# Patient Record
Sex: FEMALE | NOT HISPANIC OR LATINO | URBAN - METROPOLITAN AREA
[De-identification: names, ages, dates, MRNs, and addresses within clinical notes are randomized per-mention and may not be internally consistent; named-entity substitution may affect disease eponyms.]

---

## 2019-04-15 ENCOUNTER — OFFICE (OUTPATIENT)
Dept: URBAN - METROPOLITAN AREA CLINIC 67 | Facility: CLINIC | Age: 29
End: 2019-04-15

## 2019-04-15 VITALS — WEIGHT: 155 LBS | SYSTOLIC BLOOD PRESSURE: 112 MMHG | HEIGHT: 64 IN | DIASTOLIC BLOOD PRESSURE: 82 MMHG

## 2019-04-15 DIAGNOSIS — R19.4 ALTERED BOWEL FUNCTION: ICD-10-CM

## 2019-04-15 DIAGNOSIS — K62.89 ANORECTAL PAIN: ICD-10-CM

## 2019-04-15 DIAGNOSIS — K62.5 RECTAL BLEEDING: ICD-10-CM

## 2019-04-15 DIAGNOSIS — R14.0 BLOATING SYMPTOM: ICD-10-CM

## 2019-04-15 DIAGNOSIS — R14.3 FLATULENCE SYMPTOM: ICD-10-CM

## 2019-04-15 DIAGNOSIS — R19.7 DIARRHEA (UNSPECIFIED): ICD-10-CM

## 2019-04-15 DIAGNOSIS — K59.00 CONSTIPATION: ICD-10-CM

## 2019-04-15 PROCEDURE — 99243 OFF/OP CNSLTJ NEW/EST LOW 30: CPT | Performed by: NURSE PRACTITIONER

## 2019-04-15 RX ORDER — HYDROCORTISONE 25 MG/G
CREAM TOPICAL
Qty: 1 | Status: ACTIVE
Start: 2019-04-15

## 2019-04-15 NOTE — SERVICEHPINOTES
RAZIA CAR   is seen for an initial visit today.     29 yo patient self referred anorectal pain and tenderness, excess gas and lower abd bloating, change in bowel habits manifested by D&ltC (with hard stools) for >1 months as well as rectal bleeding w/ varying amounts of BRBPR   in the toilet water for about 2 months. Denies abd pain, N/V, anorexia or wt loss. FHX noncontributory.

## 2019-04-18 LAB
CBC (INCLUDES DIFF/PLT): ABSOLUTE BAND NEUTROPHILS: NORMAL CELLS/UL
CBC (INCLUDES DIFF/PLT): ABSOLUTE BASOPHILS: 29 CELLS/UL (ref 0–200)
CBC (INCLUDES DIFF/PLT): ABSOLUTE BLASTS: NORMAL CELLS/UL
CBC (INCLUDES DIFF/PLT): ABSOLUTE EOSINOPHILS: 69 CELLS/UL (ref 15–500)
CBC (INCLUDES DIFF/PLT): ABSOLUTE LYMPHOCYTES: 1632 CELLS/UL (ref 850–3900)
CBC (INCLUDES DIFF/PLT): ABSOLUTE METAMYELOCYTES: NORMAL CELLS/UL
CBC (INCLUDES DIFF/PLT): ABSOLUTE MONOCYTES: 299 CELLS/UL (ref 200–950)
CBC (INCLUDES DIFF/PLT): ABSOLUTE MYELOCYTES: NORMAL CELLS/UL
CBC (INCLUDES DIFF/PLT): ABSOLUTE NEUTROPHILS: 2871 CELLS/UL (ref 1500–7800)
CBC (INCLUDES DIFF/PLT): ABSOLUTE NUCLEATED RBC: 0 CELLS/UL (ref 0–?)
CBC (INCLUDES DIFF/PLT): ABSOLUTE PROMYELOCYTES: NORMAL CELLS/UL
CBC (INCLUDES DIFF/PLT): BAND NEUTROPHILS: NORMAL %
CBC (INCLUDES DIFF/PLT): BASOPHILS: 0.6 %
CBC (INCLUDES DIFF/PLT): BLASTS: NORMAL %
CBC (INCLUDES DIFF/PLT): COMMENT(S): NORMAL
CBC (INCLUDES DIFF/PLT): EOSINOPHILS: 1.4 %
CBC (INCLUDES DIFF/PLT): HEMATOCRIT: 39.6 % (ref 35–45)
CBC (INCLUDES DIFF/PLT): HEMOGLOBIN: 13.4 G/DL (ref 11.7–15.5)
CBC (INCLUDES DIFF/PLT): LYMPHOCYTES: 33.3 %
CBC (INCLUDES DIFF/PLT): MCH: 28.5 PG (ref 27–33)
CBC (INCLUDES DIFF/PLT): MCHC: 33.8 G/DL (ref 32–36)
CBC (INCLUDES DIFF/PLT): MCV: 84.3 FL (ref 80–100)
CBC (INCLUDES DIFF/PLT): METAMYELOCYTES: NORMAL %
CBC (INCLUDES DIFF/PLT): MONOCYTES: 6.1 %
CBC (INCLUDES DIFF/PLT): MPV: 12.5 FL (ref 7.5–12.5)
CBC (INCLUDES DIFF/PLT): MYELOCYTES: NORMAL %
CBC (INCLUDES DIFF/PLT): NEUTROPHILS: 58.6 %
CBC (INCLUDES DIFF/PLT): NUCLEATED RBC: NORMAL /100 WBC
CBC (INCLUDES DIFF/PLT): PLATELET COUNT: 165 THOUSAND/UL (ref 140–400)
CBC (INCLUDES DIFF/PLT): PROMYELOCYTES: NORMAL %
CBC (INCLUDES DIFF/PLT): RDW: 12.2 % (ref 11–15)
CBC (INCLUDES DIFF/PLT): REACTIVE LYMPHOCYTES: NORMAL %
CBC (INCLUDES DIFF/PLT): RED BLOOD CELL COUNT: 4.7 MILLION/UL (ref 3.8–5.1)
CBC (INCLUDES DIFF/PLT): WHITE BLOOD CELL COUNT: 4.9 THOUSAND/UL (ref 3.8–10.8)
CELIAC DISEASE COMPREHENSIVE PANEL: IMMUNOGLOBULIN A: 109 MG/DL (ref 81–463)
CELIAC DISEASE COMPREHENSIVE PANEL: INTERPRETATION: (no result)
CELIAC DISEASE COMPREHENSIVE PANEL: TISSUE TRANSGLUTAMINASE AB, IGA: <1 U/ML
COMPREHENSIVE METABOLIC PANEL: ALBUMIN/GLOBULIN RATIO: 2 (CALC) (ref 1–2.5)
COMPREHENSIVE METABOLIC PANEL: ALBUMIN: 4.6 G/DL (ref 3.6–5.1)
COMPREHENSIVE METABOLIC PANEL: ALKALINE PHOSPHATASE: 53 U/L (ref 33–115)
COMPREHENSIVE METABOLIC PANEL: ALT: 16 U/L (ref 6–29)
COMPREHENSIVE METABOLIC PANEL: AST: 20 U/L (ref 10–30)
COMPREHENSIVE METABOLIC PANEL: BILIRUBIN, TOTAL: 1 MG/DL (ref 0.2–1.2)
COMPREHENSIVE METABOLIC PANEL: BUN/CREATININE RATIO: (no result) (CALC)
COMPREHENSIVE METABOLIC PANEL: CALCIUM: 9.4 MG/DL (ref 8.6–10.2)
COMPREHENSIVE METABOLIC PANEL: CARBON DIOXIDE: 25 MMOL/L (ref 20–32)
COMPREHENSIVE METABOLIC PANEL: CHLORIDE: 104 MMOL/L (ref 98–110)
COMPREHENSIVE METABOLIC PANEL: CREATININE: 0.62 MG/DL (ref 0.5–1.1)
COMPREHENSIVE METABOLIC PANEL: EGFR AFRICAN AMERICAN: 143 ML/MIN/1.73M2 (ref 60–?)
COMPREHENSIVE METABOLIC PANEL: EGFR NON-AFR. AMERICAN: 124 ML/MIN/1.73M2 (ref 60–?)
COMPREHENSIVE METABOLIC PANEL: GLOBULIN: 2.3 G/DL (CALC) (ref 1.9–3.7)
COMPREHENSIVE METABOLIC PANEL: GLUCOSE: 82 MG/DL (ref 65–99)
COMPREHENSIVE METABOLIC PANEL: POTASSIUM: 4.2 MMOL/L (ref 3.5–5.3)
COMPREHENSIVE METABOLIC PANEL: PROTEIN, TOTAL: 6.9 G/DL (ref 6.1–8.1)
COMPREHENSIVE METABOLIC PANEL: SODIUM: 137 MMOL/L (ref 135–146)
COMPREHENSIVE METABOLIC PANEL: UREA NITROGEN (BUN): 14 MG/DL (ref 7–25)
FOOD ALLERGY PROFILE: ALMOND (F20) IGE: <0.1 KU/L
FOOD ALLERGY PROFILE: CASHEW NUT (F202) IGE: <0.1 KU/L
FOOD ALLERGY PROFILE: CLASS: 0
FOOD ALLERGY PROFILE: CODFISH (F3) IGE: <0.1 KU/L
FOOD ALLERGY PROFILE: COW'S MILK (F2) IGE: <0.1 KU/L
FOOD ALLERGY PROFILE: EGG WHITE (F1) IGE: <0.1 KU/L
FOOD ALLERGY PROFILE: HAZELNUT (F17) IGE: <0.1 KU/L
FOOD ALLERGY PROFILE: INTERPRETATION: (no result)
FOOD ALLERGY PROFILE: PEANUT (F13) IGE: <0.1 KU/L
FOOD ALLERGY PROFILE: SALMON (F41) IGE: <0.1 KU/L
FOOD ALLERGY PROFILE: SCALLOP (F338) IGE: <0.1 KU/L
FOOD ALLERGY PROFILE: SESAME SEED (F10) IGE: <0.1 KU/L
FOOD ALLERGY PROFILE: SHRIMP (F24) IGE: <0.1 KU/L
FOOD ALLERGY PROFILE: SOYBEAN (F14) IGE: <0.1 KU/L
FOOD ALLERGY PROFILE: TUNA (F40) IGE: <0.1 KU/L
FOOD ALLERGY PROFILE: WALNUT (F256) IGE: <0.1 KU/L
FOOD ALLERGY PROFILE: WHEAT (F4) IGE: <0.1 KU/L
HS CRP: 0.5 MG/L
TSH: 1.22 MIU/L

## 2019-05-01 ENCOUNTER — AMBULATORY SURGICAL CENTER (OUTPATIENT)
Dept: URBAN - METROPOLITAN AREA SURGERY 38 | Facility: SURGERY | Age: 29
End: 2019-05-01

## 2019-05-01 VITALS
TEMPERATURE: 98.2 F | RESPIRATION RATE: 15 BRPM | OXYGEN SATURATION: 96 % | HEIGHT: 64 IN | WEIGHT: 150 LBS | SYSTOLIC BLOOD PRESSURE: 120 MMHG | DIASTOLIC BLOOD PRESSURE: 75 MMHG | HEART RATE: 71 BPM

## 2019-05-01 DIAGNOSIS — R19.7 DIARRHEA, UNSPECIFIED: ICD-10-CM

## 2019-05-01 LAB — SURGICAL: PDF REPORT: (no result)

## 2019-05-01 PROCEDURE — 45331 SIGMOIDOSCOPY AND BIOPSY: CPT | Performed by: INTERNAL MEDICINE

## 2019-05-01 NOTE — SERVICEHPINOTES
RAZIA CAR is seen for an initial visit today. 29 yo patient self referred anorectal pain and tenderness, excess gas and lower abd bloating, change in bowel habits manifested by D1 months as well as rectal bleeding w/ varying amounts of BRBPR in the toilet water for about 2 months. Denies abd pain, N/V, anorexia or wt loss. FHX noncontributory.

## 2019-09-04 LAB — US ABDOMEN COMPLETE: PDF REPORT: (no result)

## 2022-10-07 ENCOUNTER — HOSPITAL ENCOUNTER (EMERGENCY)
Dept: HOSPITAL 54 - ER | Age: 32
Discharge: HOME | End: 2022-10-07
Payer: COMMERCIAL

## 2022-10-07 VITALS — BODY MASS INDEX: 26.46 KG/M2 | HEIGHT: 64 IN | WEIGHT: 155 LBS

## 2022-10-07 VITALS — DIASTOLIC BLOOD PRESSURE: 82 MMHG | SYSTOLIC BLOOD PRESSURE: 115 MMHG
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
## 2022-10-07 DIAGNOSIS — S41.112A: Primary | ICD-10-CM
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
## 2022-10-07 NOTE — NUR
BIBS FOR C/O BRIAN DOG BITE. TETANUS AND ALL DOG'S VACCINES UP TO DATE. AWAKE 
AND ALERTX4 AMBULATORY WITH STEADY GAIT. TO ER BED 4.